# Patient Record
Sex: MALE | Race: WHITE
[De-identification: names, ages, dates, MRNs, and addresses within clinical notes are randomized per-mention and may not be internally consistent; named-entity substitution may affect disease eponyms.]

---

## 2017-03-27 ENCOUNTER — HOSPITAL ENCOUNTER (EMERGENCY)
Dept: HOSPITAL 58 - ED | Age: 32
Discharge: HOME | End: 2017-03-27

## 2017-03-27 VITALS — BODY MASS INDEX: 31.1 KG/M2

## 2017-03-27 VITALS — TEMPERATURE: 99.9 F | SYSTOLIC BLOOD PRESSURE: 149 MMHG | DIASTOLIC BLOOD PRESSURE: 92 MMHG

## 2017-03-27 DIAGNOSIS — J02.9: Primary | ICD-10-CM

## 2017-03-27 DIAGNOSIS — F17.210: ICD-10-CM

## 2017-03-27 PROCEDURE — 87880 STREP A ASSAY W/OPTIC: CPT

## 2017-03-27 PROCEDURE — 99283 EMERGENCY DEPT VISIT LOW MDM: CPT

## 2017-03-27 PROCEDURE — 87651 STREP A DNA AMP PROBE: CPT

## 2017-03-27 PROCEDURE — 96372 THER/PROPH/DIAG INJ SC/IM: CPT

## 2017-03-27 NOTE — ED.PDOC
General


ED Provider: 


Dr. ROXANNE ABBASI





Chief Complaint: Sore Throat


Stated Complaint: sore throat


Time Seen by Physician: 09:10


Mode of Arrival: Walk-In


Information Source: Patient


Exam Limitations: No limitations


Primary Care Provider: 


TASIA RAM





Nursing and Triage Documentation Reviewed and Agree: Yes





EENT Complaint Exam





- Throat Complaint/Exam


Symptoms Are: Still present


Timimg: Constant


Initial Severity: Moderate


Current Severity: Moderate


Aggravating: Reports: Eating


Alleviating: Reports: None


Associated Signs and Symptoms: Reports: Cough, Nasal congestion.  Denies: Fever

, Dysphagia, Drooling, Foreign body sensation, Chills, Wheezing, Hoarseness, 

Sinus discomfort, Difficulty breathing, Lethargy, Irritability, Decreased 

activity, Vomiting, Diarrhea, Decreased hearing, Ear drainage


Uvula Midline: Yes


Joyce-tonsillar Fluctuence: No


Scarlatinaform Rash Present: No


Stridor Present: No


Sinus Tenderness Present: No


Tonsillar Hypertrophy Present: No


Tonsillar Exudate Present: No


Joyce-tonsillar Swelling Present: Yes (left side withe exudate , the swelling 

how ever is minimal  nonetoxic pt)


Adenopathy Present: No


Splenomegaly Present: No


Differential Diagnoses: Pharyngitis, Tonsillitis





Review of Systems





- Review Of Systems


Constitutional: Reports: No symptoms


Eyes: Reports: No symptoms


Ears, Nose, Mouth, Throat: Reports: Throat pain


Respiratory: Reports: No symptoms


Cardiac: Reports: No symptoms


GI: Reports: No symptoms


: Reports: No symptoms


Musculoskeletal: Reports: No symptoms


Skin: Reports: No symptoms


Neurological: Reports: No symptoms


Endocrine: Reports: No symptoms


Hematologic/Lymphatic: Reports: No symptoms


All Other Systems: Reviewed and Negative





Past Medical History





- Past Medical History


Endocrine: Reports: None


Cardiovascular: Reports: None


Respiratory: Reports: None


Hematological: Reports: None


Gastrointestinal: Reports: GERD


Genitourinary: Reports: None


Neuro/Psych: Reports: None


Musculoskeletal: Reports: None


Cancer: Reports: None





- Surgical History


General Surgical History: Reports: Hernia Repair (umbilical hernia repair), 

Other (gastroscopy for gerd(not bronchoscopy))





- Family History


Family History: Reports: Unknown





- Social History


Smoking Status: Current some day smoker


Hx Substance Use: No


Alcohol Screening: None





Physical Exam





- Physical Exam


Appearance: Well-appearing, No pain distress, Well-nourished


Eyes: YAN, EOMI, Conjunctiva clear


ENT: Erythema, Exudate


Respiratory: Airway patent, Breath sounds clear, Breath sounds equal, 

Respirations nonlabored


Cardiovascular: RRR, Pulses normal, No rub, No murmur


GI/: Soft, Nontender, No masses, Bowel sounds normal, No Organomegaly


Musculoskeletal: Normal strength, ROM intact, No edema, No calf tenderness


Skin: Warm, Dry, Normal color


Neurological: Sensation intact, Motor intact, Reflexes intact, Cranial nerves 

intact, Alert, Oriented


Psychiatric: Affect appropriate, Mood appropriate





Critical Care Note





- Critical Care Note


Total Time (mins): 0





Course





- Course


Vital Signs: 





 











  Temp Pulse Resp BP Pulse Ox


 


 03/27/17 09:09  99.9 F H  84  20  149/92 H  96














Departure





- Departure


Time of Disposition: 09:25


Disposition: HOME SELF-CARE


Discharge Problem: 


 Sore throat symptom, Streptococcal sore throat





Pharyngitis


Qualifiers:


 Pharyngitis/tonsillitis etiology: unspecified etiology Qualifier Code: (J02.9) 

Acute pharyngitis, unspecified





Instructions:  Pharyngitis (ED), Strep Throat (ED), Tonsillitis (ED)


Condition: Good


Pt referred to PMD for follow-up: No


Additional Instructions: 


Please call your Family Physician as soon as possible to schedule a follow-up 

appointment.follow the printed discharge instruction carefully


Allergies/Adverse Reactions: 


Allergies





No Known Allergies Allergy (Verified 03/27/17 09:13)


 








Home Medications: 


Ambulatory Orders





Ranitidine HCl [Zantac] 300 mg PO BID 10/12/14 


Amoxicillin 500 mg PO Q6HR #42 tablet 03/27/17

## 2017-06-09 ENCOUNTER — HOSPITAL ENCOUNTER (EMERGENCY)
Dept: HOSPITAL 58 - ED | Age: 32
Discharge: HOME | End: 2017-06-09

## 2017-06-09 VITALS — BODY MASS INDEX: 31.1 KG/M2

## 2017-06-09 VITALS — DIASTOLIC BLOOD PRESSURE: 98 MMHG | SYSTOLIC BLOOD PRESSURE: 157 MMHG | TEMPERATURE: 99.4 F
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
## 2017-06-09 DIAGNOSIS — F17.210: ICD-10-CM

## 2017-06-09 DIAGNOSIS — S61.219A: Primary | ICD-10-CM

## 2017-06-09 PROCEDURE — 96372 THER/PROPH/DIAG INJ SC/IM: CPT

## 2017-06-09 PROCEDURE — 99283 EMERGENCY DEPT VISIT LOW MDM: CPT

## 2017-06-09 NOTE — ED.PDOC
General


ED Provider: 


Dr. LELA MORIN





Chief Complaint: Finger Laceration


Stated Complaint: while cleaning the car, hand stuck on sharp object, and hat 

to rt hand.


Time Seen by Physician: 17:39


Mode of Arrival: Walk-In


Information Source: Patient


Primary Care Provider: 


TASIA RAM





Nursing and Triage Documentation Reviewed and Agree: Yes





Skin Complaint Exam





- Laceration/Upper Ext. Complaint/Exam


Location of Injury: Right


Mechanism of Injury: Laceration


Symptoms Are: Still present


Initial Severity: Mild


Current Severity: Mild


Aggravating: Movement


Alleviating: None


Differential Diagnoses: Laceration





Review of Systems





- Review Of Systems


Constitutional: Reports: No symptoms


Eyes: Reports: No symptoms


Ears, Nose, Mouth, Throat: Reports: No symptoms


Respiratory: Reports: No symptoms


Cardiac: Reports: No symptoms


GI: Reports: No symptoms


: Reports: No symptoms


Musculoskeletal: Reports: No symptoms


Skin: Reports: No symptoms


Neurological: Reports: No symptoms


Endocrine: Reports: No symptoms


Hematologic/Lymphatic: Reports: No symptoms


All Other Systems: Reviewed and Negative





Past Medical History





- Past Medical History


Previously Healthy: No


Endocrine: Reports: None


Cardiovascular: Reports: None


Respiratory: Reports: None


Hematological: Reports: None


Gastrointestinal: Reports: GERD


Genitourinary: Reports: None


Neuro/Psych: Reports: None


Musculoskeletal: Reports: None


Cancer: Reports: None





- Surgical History


General Surgical History: Reports: Hernia Repair (umbilical hernia repair), 

Other (gastroscopy for gerd(not bronchoscopy))





- Family History


Family History: Reports: Unknown





- Social History


Smoking Status: Current some day smoker, Former smoker


Smoking Cessation Counseling Time: > 3 min - 10 min


Hx Substance Use: No


Alcohol Screening: Occasionally





- Immunizations


Tetanus Shot up to Date: Yes





Physical Exam





- Physical Exam


Appearance: Well-appearing, No pain distress, Well-nourished


Eyes: YAN, EOMI, Conjunctiva clear


ENT: Ears normal, Nose normal, Oropharynx normal


Respiratory: Airway patent, Breath sounds clear, Breath sounds equal, 

Respirations nonlabored


Cardiovascular: RRR, Pulses normal, No rub, No murmur


GI/: Soft, Nontender, No masses, Bowel sounds normal, No Organomegaly


Musculoskeletal: Normal strength, ROM intact, No edema, No calf tenderness


Skin: Warm, Dry, Normal color


Neurological: Sensation intact, Motor intact, Reflexes intact, Cranial nerves 

intact, Alert, Oriented


Psychiatric: Affect appropriate, Mood appropriate





Procedures





- Laceration/Wound Repair


  ** No standard instances


Wound Description: Linear


Wound Length (cm): 2 cm


Wound Width: 0.2 cm


Wound Explored: Clean


Wound Irrigated: Yes


Wound Prep: Saline, Hibiclens


Anesthesia: Lidocaine


Wound Repaired With: Sutures


Number of Sutures: 3





Critical Care Note





- Critical Care Note


Total Time (mins): 0





Course





- Course


Orders, Labs, Meds: 


Orders











 Category Date Time Status


 


 Lidocaine HCl/Pf [Lidocaine 1 % Amp 5 ml (Sutures)] MEDS  06/09/17 17:39 

Discontinued





 5 ml SUBCUT ONCE STA   








Medications














Discontinued Medications














Generic Name Dose Route Start Last Admin





  Trade Name Freq  PRN Reason Stop Dose Admin


 


Lidocaine HCl  5 ml  06/09/17 17:39  





  Lidocaine 1 % Amp 5 Ml (Sutures)  SUBCUT  06/09/17 17:40  





  ONCE STA   











Vital Signs: 


 











  Temp Pulse Resp BP Pulse Ox


 


 06/09/17 17:23  99.4 F  98 H  18  157/98 H  98














Departure





- Departure


Time of Disposition: 17:55


Disposition: HOME SELF-CARE


Discharge Problem: 


 Laceration of finger





Instructions:  Laceration (ED)


Condition: Stable


Pt referred to PMD for follow-up: Yes


Additional Instructions: 


Tylenol prn


keep wound clean


If redness or swelling needs f/u with PMD





Allergies/Adverse Reactions: 


Allergies





No Known Allergies Allergy (Verified 06/09/17 17:27)


 








Home Medications: 


Ambulatory Orders





Ranitidine HCl [Zantac] 300 mg PO BID 10/12/14 








Disposition Discussed With: Patient

## 2017-06-15 RX ORDER — RANITIDINE 300 MG/1
TABLET ORAL
Qty: 60 TABLET | Refills: 2 | Status: SHIPPED | OUTPATIENT
Start: 2017-06-15 | End: 2017-09-15 | Stop reason: SDUPTHER

## 2017-06-19 ENCOUNTER — APPOINTMENT (OUTPATIENT)
Dept: CT IMAGING | Age: 32
End: 2017-06-19
Payer: MEDICAID

## 2017-06-19 ENCOUNTER — APPOINTMENT (OUTPATIENT)
Dept: GENERAL RADIOLOGY | Age: 32
End: 2017-06-19
Payer: MEDICAID

## 2017-06-19 ENCOUNTER — HOSPITAL ENCOUNTER (EMERGENCY)
Age: 32
Discharge: HOME | End: 2017-06-19
Payer: MEDICAID

## 2017-06-19 VITALS
HEIGHT: 70 IN | RESPIRATION RATE: 20 BRPM | TEMPERATURE: 98 F | WEIGHT: 235 LBS | OXYGEN SATURATION: 99 % | HEART RATE: 78 BPM | BODY MASS INDEX: 33.64 KG/M2 | SYSTOLIC BLOOD PRESSURE: 154 MMHG | DIASTOLIC BLOOD PRESSURE: 90 MMHG

## 2017-06-19 DIAGNOSIS — M54.50 ACUTE MIDLINE LOW BACK PAIN WITHOUT SCIATICA: ICD-10-CM

## 2017-06-19 DIAGNOSIS — S16.1XXA CERVICAL STRAIN, ACUTE, INITIAL ENCOUNTER: ICD-10-CM

## 2017-06-19 DIAGNOSIS — M25.551 HIP PAIN, ACUTE, RIGHT: ICD-10-CM

## 2017-06-19 DIAGNOSIS — M79.672 LEFT FOOT PAIN: ICD-10-CM
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
## 2017-06-19 LAB
ALBUMIN SERPL-MCNC: 4.5 G/DL (ref 3.5–5.2)
ALP BLD-CCNC: 67 U/L (ref 40–130)
ALT SERPL-CCNC: 80 U/L (ref 5–41)
ANION GAP SERPL CALCULATED.3IONS-SCNC: 17 MMOL/L (ref 7–19)
AST SERPL-CCNC: 39 U/L (ref 5–40)
BILIRUB SERPL-MCNC: 0.4 MG/DL (ref 0.2–1.2)
BUN BLDV-MCNC: 14 MG/DL (ref 6–20)
CALCIUM SERPL-MCNC: 9.6 MG/DL (ref 8.6–10)
CHLORIDE BLD-SCNC: 102 MMOL/L (ref 98–111)
CO2: 22 MMOL/L (ref 22–29)
CREAT SERPL-MCNC: 1 MG/DL (ref 0.5–1.2)
GFR NON-AFRICAN AMERICAN: >60
GLUCOSE BLD-MCNC: 93 MG/DL (ref 74–109)
HCT VFR BLD CALC: 48.2 % (ref 42–52)
HEMOGLOBIN: 16 G/DL (ref 14–18)
MCH RBC QN AUTO: 30.2 PG (ref 27–31)
MCHC RBC AUTO-ENTMCNC: 33.2 G/DL (ref 33–37)
MCV RBC AUTO: 90.9 FL (ref 80–94)
PDW BLD-RTO: 13.2 % (ref 11.5–14.5)
PLATELET # BLD: 294 K/UL (ref 130–400)
PMV BLD AUTO: 9.5 FL (ref 9.4–12.4)
POTASSIUM SERPL-SCNC: 3.8 MMOL/L (ref 3.5–5)
RBC # BLD: 5.3 M/UL (ref 4.7–6.1)
SODIUM BLD-SCNC: 141 MMOL/L (ref 136–145)
TOTAL PROTEIN: 8.3 G/DL (ref 6.6–8.7)
WBC # BLD: 14.2 K/UL (ref 4.8–10.8)

## 2017-06-19 PROCEDURE — 70450 CT HEAD/BRAIN W/O DYE: CPT

## 2017-06-19 PROCEDURE — 6360000002 HC RX W HCPCS

## 2017-06-19 PROCEDURE — 99283 EMERGENCY DEPT VISIT LOW MDM: CPT | Performed by: NURSE PRACTITIONER

## 2017-06-19 PROCEDURE — 72072 X-RAY EXAM THORAC SPINE 3VWS: CPT

## 2017-06-19 PROCEDURE — 85027 COMPLETE CBC AUTOMATED: CPT

## 2017-06-19 PROCEDURE — 73502 X-RAY EXAM HIP UNI 2-3 VIEWS: CPT

## 2017-06-19 PROCEDURE — 72125 CT NECK SPINE W/O DYE: CPT

## 2017-06-19 PROCEDURE — 73630 X-RAY EXAM OF FOOT: CPT

## 2017-06-19 PROCEDURE — 96374 THER/PROPH/DIAG INJ IV PUSH: CPT

## 2017-06-19 PROCEDURE — 36415 COLL VENOUS BLD VENIPUNCTURE: CPT

## 2017-06-19 PROCEDURE — 99284 EMERGENCY DEPT VISIT MOD MDM: CPT

## 2017-06-19 PROCEDURE — 96375 TX/PRO/DX INJ NEW DRUG ADDON: CPT

## 2017-06-19 PROCEDURE — 71250 CT THORAX DX C-: CPT

## 2017-06-19 PROCEDURE — 80053 COMPREHEN METABOLIC PANEL: CPT

## 2017-06-19 PROCEDURE — 6360000002 HC RX W HCPCS: Performed by: NURSE PRACTITIONER

## 2017-06-19 PROCEDURE — 72100 X-RAY EXAM L-S SPINE 2/3 VWS: CPT

## 2017-06-19 RX ORDER — HYDROCODONE BITARTRATE AND ACETAMINOPHEN 5; 325 MG/1; MG/1
1 TABLET ORAL EVERY 6 HOURS PRN
Qty: 15 TABLET | Refills: 0 | Status: SHIPPED | OUTPATIENT
Start: 2017-06-19 | End: 2017-06-26

## 2017-06-19 RX ORDER — ONDANSETRON 2 MG/ML
4 INJECTION INTRAMUSCULAR; INTRAVENOUS ONCE
Status: COMPLETED | OUTPATIENT
Start: 2017-06-19 | End: 2017-06-19

## 2017-06-19 RX ORDER — CYCLOBENZAPRINE HCL 10 MG
10 TABLET ORAL 3 TIMES DAILY PRN
Qty: 30 TABLET | Refills: 0 | Status: SHIPPED | OUTPATIENT
Start: 2017-06-19 | End: 2017-06-29

## 2017-06-19 RX ADMIN — HYDROMORPHONE HYDROCHLORIDE 1 MG: 1 INJECTION, SOLUTION INTRAMUSCULAR; INTRAVENOUS; SUBCUTANEOUS at 19:58

## 2017-06-19 RX ADMIN — ONDANSETRON 4 MG: 2 INJECTION INTRAMUSCULAR; INTRAVENOUS at 19:32

## 2017-06-19 RX ADMIN — HYDROMORPHONE HYDROCHLORIDE 1 MG: 1 INJECTION, SOLUTION INTRAMUSCULAR; INTRAVENOUS; SUBCUTANEOUS at 19:33

## 2017-06-19 ASSESSMENT — ENCOUNTER SYMPTOMS
BACK PAIN: 1
SHORTNESS OF BREATH: 0
NAUSEA: 1
ABDOMINAL PAIN: 1
VOMITING: 0

## 2017-06-19 ASSESSMENT — PAIN DESCRIPTION - PAIN TYPE: TYPE: ACUTE PAIN

## 2017-06-19 ASSESSMENT — PAIN SCALES - GENERAL
PAINLEVEL_OUTOF10: 8
PAINLEVEL_OUTOF10: 9
PAINLEVEL_OUTOF10: 5

## 2017-06-19 ASSESSMENT — PAIN DESCRIPTION - LOCATION: LOCATION: GENERALIZED

## 2017-06-20 PROBLEM — K76.0 FATTY LIVER: Status: ACTIVE | Noted: 2017-06-20

## 2017-06-27 ENCOUNTER — HOSPITAL ENCOUNTER (EMERGENCY)
Dept: HOSPITAL 58 - ED | Age: 32
Discharge: HOME | End: 2017-06-27

## 2017-06-27 VITALS — BODY MASS INDEX: 31.8 KG/M2

## 2017-06-27 VITALS — SYSTOLIC BLOOD PRESSURE: 152 MMHG | TEMPERATURE: 99.3 F | DIASTOLIC BLOOD PRESSURE: 92 MMHG

## 2017-06-27 DIAGNOSIS — M54.5: Primary | ICD-10-CM
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
## 2017-06-27 DIAGNOSIS — S91.209D: ICD-10-CM

## 2017-06-27 DIAGNOSIS — F17.210: ICD-10-CM

## 2017-06-27 PROCEDURE — 99283 EMERGENCY DEPT VISIT LOW MDM: CPT

## 2017-06-27 PROCEDURE — 96372 THER/PROPH/DIAG INJ SC/IM: CPT
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
## 2017-06-28 PROBLEM — F41.9 ANXIETY: Chronic | Status: ACTIVE | Noted: 2017-06-28

## 2017-06-28 PROBLEM — Z00.00 WELLNESS EXAMINATION: Status: ACTIVE | Noted: 2017-06-28

## 2017-06-28 PROBLEM — K08.9 TEETH PROBLEM: Status: ACTIVE | Noted: 2017-06-28

## 2017-06-28 PROBLEM — K21.9 GASTROESOPHAGEAL REFLUX DISEASE: Chronic | Status: ACTIVE | Noted: 2017-06-28

## 2017-06-28 PROBLEM — R31.9 HEMATURIA: Status: ACTIVE | Noted: 2017-06-28

## 2017-06-29 ENCOUNTER — OFFICE VISIT (OUTPATIENT)
Dept: FAMILY MEDICINE CLINIC | Facility: CLINIC | Age: 32
End: 2017-06-29

## 2017-06-29 VITALS
HEIGHT: 72 IN | OXYGEN SATURATION: 97 % | WEIGHT: 235 LBS | RESPIRATION RATE: 16 BRPM | DIASTOLIC BLOOD PRESSURE: 92 MMHG | HEART RATE: 80 BPM | TEMPERATURE: 98.5 F | BODY MASS INDEX: 31.83 KG/M2 | SYSTOLIC BLOOD PRESSURE: 124 MMHG

## 2017-06-29 DIAGNOSIS — M54.9 BACK PAIN, UNSPECIFIED BACK LOCATION, UNSPECIFIED BACK PAIN LATERALITY, UNSPECIFIED CHRONICITY: ICD-10-CM
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
## 2017-06-29 DIAGNOSIS — S90.229A CONTUSION OF TOE WITH DAMAGE TO NAIL, UNSPECIFIED TOE, INITIAL ENCOUNTER: ICD-10-CM

## 2017-06-29 PROBLEM — S90.129A CONTUSION, TOE: Status: ACTIVE | Noted: 2017-06-29

## 2017-06-29 PROCEDURE — 99213 OFFICE O/P EST LOW 20 MIN: CPT | Performed by: FAMILY MEDICINE

## 2017-06-29 RX ORDER — CYCLOBENZAPRINE HCL 10 MG
1 TABLET ORAL 3 TIMES DAILY PRN
Refills: 0 | COMMUNITY
Start: 2017-06-19 | End: 2021-10-30 | Stop reason: SDUPTHER

## 2017-06-29 RX ORDER — NAPROXEN 500 MG/1
1 TABLET ORAL EVERY 12 HOURS
Refills: 0 | COMMUNITY
Start: 2017-06-27 | End: 2021-10-30 | Stop reason: SDUPTHER

## 2017-06-29 RX ORDER — HYDROCODONE BITARTRATE AND ACETAMINOPHEN 5; 325 MG/1; MG/1
1-2 TABLET ORAL EVERY 6 HOURS PRN
Refills: 0 | COMMUNITY
Start: 2017-06-27 | End: 2017-07-06 | Stop reason: SDUPTHER
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
## 2017-07-06 RX ORDER — HYDROCODONE BITARTRATE AND ACETAMINOPHEN 5; 325 MG/1; MG/1
1-2 TABLET ORAL EVERY 6 HOURS PRN
Qty: 30 TABLET | Refills: 0 | Status: SHIPPED | OUTPATIENT
Start: 2017-07-06 | End: 2017-07-31
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
## 2017-07-17 NOTE — RS.OPPTDN
Subjective


Date of Note: 07/17/17


Visit #: 2


Date of Evaluation: 07/12/17


Payer Source: Medicaid


Treatment Diagnosis: Low back pain


Current Subjective/complaints:: Patient reports he is more comfortable resting 

on his stomach currently.





Pain Assessment





- Pain Description


Pain Location: Low back and right hip region


Pain Description: Radiating, Aching


Current Pain Intensity: 8/10





- Treatment


Modality: Electrical Stim Unattended


Parameters/Method Applied: 20 mins. high volt to lumbar area in prone ,channel 

1 @ 170 pv, channel 2 @ 160 pv.


Patient Position: Prone





- Heat/Cryotherapy


Treatment: Hot Pack (concurrent with e-stim)





Interventions





- Exercise/Activities/Manual Therapy


Exercises/Activities: Attempted exercises in prone and supine ,but both 

elicited pain.


Total minutes of Exercise: 0


Manual Therapy: NA


Total minutes of Manual Therapy: 0


HOME EXERCISE PROGRAM: none at this time





- Charges


Total Direct Minutes: 0


Total Treatment Time: 20


Procedures billed for this date of service:: hp,e-stim


Assessment: Patient reports sciatic pain today in te R hip  and LE.he reports 

increased pain with attempting flexion or extension.We will resume exercises 

when he can tolerate them,possibly use manual therapy for pain control .


Patient Education: Education of diagnosis, Body/Joint mechanics, Home Exercise 

Program, Home Safety, Activity Modification, Education of Plan of Care


Patient demonstrates compliance with HEP?: Yes





Short Term Goals


Goal #1: Patient will display independence with home exercise program. 


Goal to be met by: 07/27/17


Goal #2: Lumbar paraspinals decreased to mininmal.


Goal to be met by: 07/27/17


Progress towards Goal:: No Change


Goal #3: Pain at rest <5/10.


Goal to be met by: 07/27/17


Goal #4: Lumbar AROM WFL's with minimal discomfort.


Goal to be met by: 07/27/17





Long Term Goals


Goal #1: Pt knows HEP and to continue ex's to maintain level at D/C.


Goal to be met by: 08/17/17


Goal #2: Score on Oswestry LBP scale improved to 30.


Goal to be met by: 08/17/17


Goal #3: Patient will report normal sleep pattern.


Goal to be met by: 08/17/17


Goal #4: Pt able to tolerate prolonged standing & walking with minimal back 

pain.


Goal to be met by: 08/17/17





Plan


PLAN OF CARE EXPIRES ON:: 08/17/17


ORDER # VISITS AND/OR THROUGH DATE: 8/17/17


PLAN: Continue Plan of Care

## 2017-07-21 NOTE — RS.OPPTDN
Subjective


Date of Note: 07/21/17


Visit #: 3


Date of Evaluation: 07/12/17


Payer Source: Medicaid


Treatment Diagnosis: Low back pain


Current Subjective/complaints:: Reports the back is slightly better today,but 

was off from work on Wed. and Thurs.He did work today,feels fatigued.





Pain Assessment





- Pain Description


Pain Location: Low back and right hip region


Pain Description: Radiating, Aching


Current Pain Intensity: 7/10





- Treatment


Modality: Electrical Stim Unattended


Parameters/Method Applied: 20 mins. high volt to lumbar,channel 1 @ 105 and 

channel 2 @ 135 pv.


Patient Position: Prone





- Heat/Cryotherapy


Treatment: Hot Pack (concurrent with e-stim)





Interventions





- Exercise/Activities/Manual Therapy


Exercises/Activities: 20 mins. SKTC,DKTC,90/90 hamstring stretches,piriformis 

stretches,pelvic tilts.


Total minutes of Exercise: 20


Manual Therapy: NA


Total minutes of Manual Therapy: 0


HOME EXERCISE PROGRAM: None at this time.





- Charges


Total Direct Minutes: 20


Total Treatment Time: 40


Procedures billed for this date of service:: hp,e-stim,ex 1


Assessment: Patient able to tolerate the exercises today ,as his pain is 

slightly better.He has moderate tightness in the hamstrings,but responds well 

to the stretches.He has good understanding of positioning for pain relief.


Patient Education: Education of diagnosis, Body/Joint mechanics, Home Exercise 

Program, Home Safety, Activity Modification, Education of Plan of Care


Patient demonstrates compliance with HEP?: Yes





Short Term Goals


Goal #1: Patient will display independence with home exercise program. 


Goal to be met by: 07/27/17


Progress towards Goal:: Progressing


Goal #2: Lumbar paraspinals decreased to mininmal.


Goal to be met by: 07/27/17


Progress towards Goal:: Progressing


Goal #3: Pain at rest <5/10.


Goal to be met by: 07/27/17


Goal #4: Lumbar AROM WFL's with minimal discomfort.


Goal to be met by: 07/27/17





Long Term Goals


Goal #1: Pt knows HEP and to continue ex's to maintain level at D/C.


Goal to be met by: 08/17/17


Progress towards goal: Progressing


Goal #2: Score on Oswestry LBP scale improved to 30.


Goal to be met by: 08/17/17


Goal #3: Patient will report normal sleep pattern.


Goal to be met by: 08/17/17


Goal #4: Pt able to tolerate prolonged standing & walking with minimal back 

pain.


Goal to be met by: 08/17/17





Plan


PLAN OF CARE EXPIRES ON:: 08/17/17


ORDER # VISITS AND/OR THROUGH DATE: 8/17/17


PLAN: Continue Plan of Care

## 2017-07-25 NOTE — RS.OPPTDN
Subjective


Date of Note: 07/25/17


Visit #: 4


Date of Evaluation: 07/12/17


Payer Source: Medicaid


Treatment Diagnosis: Low back pain


Current Subjective/complaints:: Patient reports the back feels about the same 

today,has had busy work day.





Pain Assessment





- Pain Description


Pain Location: Low back


Pain Description: Aching


Current Pain Intensity: 7/10





- Treatment


Modality: Electrical Stim Unattended


Parameters/Method Applied: 20 mins. high volt,channel 1 and 2 @ 120 pv.


Patient Position: Prone





- Heat/Cryotherapy


Treatment: Hot Pack (concurrent with e-stim)





Interventions





- Exercise/Activities/Manual Therapy


Exercises/Activities: 20 mins. SKTC,DKTC,90/90 hamstring stretches with 

contract - relax,piriformis stretches,pelvic tilts.Alternating hip flexion ,

resisted hip flexion.


Total minutes of Exercise: 20


Manual Therapy: NA


Total minutes of Manual Therapy: 0


HOME EXERCISE PROGRAM: Pelvic tilts,SKTC,DKTC,90/90 hams. stretches,piriformis 

stretches,prone on elbows.





- Charges


Total Direct Minutes: 20


Total Treatment Time: 40


Procedures billed for this date of service:: hp,e-stim,ex 1


Assessment: Patient has moderate hamstring tightness bilaterally.He has 

increased pain with R piriformis stretch today.He reports the pain varies with 

amount of daily tasks,frequently has to change positions while sleeping.


Patient Education: Education of diagnosis, Body/Joint mechanics, Home Exercise 

Program, Home Safety, Activity Modification, Education of Plan of Care





Short Term Goals


Goal #1: Patient will display independence with home exercise program. 


Goal to be met by: 07/27/17


Progress towards Goal:: Partially Met


Goal #2: Lumbar paraspinals decreased to mininmal.


Goal to be met by: 07/27/17


Progress towards Goal:: Progressing


Goal #3: Pain at rest <5/10.


Goal to be met by: 07/27/17


Progress towards Goal:: No Change


Goal #4: Lumbar AROM WFL's with minimal discomfort.


Goal to be met by: 07/27/17


Progress towards Goal:: Progressing





Long Term Goals


Goal #1: Pt knows HEP and to continue ex's to maintain level at D/C.


Goal to be met by: 08/17/17


Progress towards goal: Progressing


Goal #2: Score on Oswestry LBP scale improved to 30.


Goal to be met by: 08/17/17


Goal #3: Patient will report normal sleep pattern.


Goal to be met by: 08/17/17


Goal #4: Pt able to tolerate prolonged standing & walking with minimal back 

pain.


Goal to be met by: 08/17/17





Plan


PLAN OF CARE EXPIRES ON:: 08/17/17


ORDER # VISITS AND/OR THROUGH DATE: 8/17/17


PLAN: Continue Plan of Care

## 2017-07-28 ENCOUNTER — HOSPITAL ENCOUNTER (OUTPATIENT)
Dept: HOSPITAL 58 - REHAB | Age: 32
LOS: 3 days | End: 2017-07-31
Attending: FAMILY MEDICINE

## 2017-07-28 VITALS — BODY MASS INDEX: 31.8 KG/M2

## 2017-07-28 DIAGNOSIS — M54.9: Primary | ICD-10-CM

## 2017-07-28 NOTE — RS.OPPTDN
Subjective


Date of Note: 07/28/17


Visit #: 5


Date of Evaluation: 07/12/17


Payer Source: Medicaid


Treatment Diagnosis: Low back pain


Current Subjective/complaints:: Patient reports his back pain is about the same 

today,has been on his hands and knees alot today at work.





Pain Assessment





- Pain Description


Pain Location: Low back /R hip


Pain Description: Aching


Current Pain Intensity: 7/10





- Treatment


Modality: Electrical Stim Unattended


Parameters/Method Applied: 20 mins. high volt to lumbar ,channel 1 and channel 

2 @ 155 pv.


Patient Position: Prone





- Heat/Cryotherapy


Treatment: Hot Pack (co ncurrent with e-stim)





Interventions





- Exercise/Activities/Manual Therapy


Exercises/Activities: 20 mins. SKTC,DKTC,90/90 hamstring stretches with 

contract - relax,piriformis stretches,pelvic tilts.Alternating hip flexion ,

resisted hip flexion.


Total minutes of Exercise: 20


Manual Therapy: NA


Total minutes of Manual Therapy: 0


HOME EXERCISE PROGRAM: Pelvic tilts,SKTC,DKTC,90/90 hams. stretches,piriformis 

stretches,prone on elbows.





- Charges


Total Direct Minutes: 20


Total Treatment Time: 40


Procedures billed for this date of service:: hp,e-stim,ex 1


Assessment: Patient has increased tightness today in bilateral piriformis 

muscles,R > L today.He has improved hamstring extensibility present.He gets 

relief from prone and supine positioning today.


Patient Education: Education of diagnosis, Body/Joint mechanics, Home Exercise 

Program, Home Safety, Activity Modification, Education of Plan of Care


Patient demonstrates compliance with HEP?: Yes





Short Term Goals


Goal #1: Patient will display independence with home exercise program. 


Goal to be met by: 07/27/17


Progress towards Goal:: Partially Met


Goal #2: Lumbar paraspinals decreased to mininmal.


Goal to be met by: 07/27/17


Progress towards Goal:: Progressing


Goal #3: Pain at rest <5/10.


Goal to be met by: 07/27/17


Progress towards Goal:: No Change


Goal #4: Lumbar AROM WFL's with minimal discomfort.


Goal to be met by: 07/27/17


Progress towards Goal:: Progressing





Long Term Goals


Goal #1: Pt knows HEP and to continue ex's to maintain level at D/C.


Goal to be met by: 08/17/17


Progress towards goal: Progressing


Goal #2: Score on Oswestry LBP scale improved to 30.


Goal to be met by: 08/17/17


Goal #3: Patient will report normal sleep pattern.


Goal to be met by: 08/17/17


Goal #4: Pt able to tolerate prolonged standing & walking with minimal back 

pain.


Goal to be met by: 08/17/17





Plan


PLAN OF CARE EXPIRES ON:: 08/17/17


ORDER # VISITS AND/OR THROUGH DATE: 8/17/17


PLAN: Continue Plan of Care

## 2017-07-31 ENCOUNTER — OFFICE VISIT (OUTPATIENT)
Dept: FAMILY MEDICINE CLINIC | Facility: CLINIC | Age: 32
End: 2017-07-31

## 2017-07-31 VITALS
DIASTOLIC BLOOD PRESSURE: 100 MMHG | HEART RATE: 100 BPM | TEMPERATURE: 98.7 F | HEIGHT: 72 IN | RESPIRATION RATE: 18 BRPM | OXYGEN SATURATION: 98 % | WEIGHT: 231 LBS | BODY MASS INDEX: 31.29 KG/M2 | SYSTOLIC BLOOD PRESSURE: 160 MMHG

## 2017-07-31 DIAGNOSIS — S91.203D: ICD-10-CM
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
## 2017-07-31 DIAGNOSIS — S90.229A CONTUSION OF TOE WITH DAMAGE TO NAIL, UNSPECIFIED TOE, INITIAL ENCOUNTER: ICD-10-CM

## 2017-07-31 DIAGNOSIS — M54.9 BACK PAIN, UNSPECIFIED BACK LOCATION, UNSPECIFIED BACK PAIN LATERALITY, UNSPECIFIED CHRONICITY: Primary | ICD-10-CM

## 2017-07-31 PROCEDURE — 99213 OFFICE O/P EST LOW 20 MIN: CPT | Performed by: FAMILY MEDICINE

## 2017-07-31 RX ORDER — HYDROCODONE BITARTRATE AND ACETAMINOPHEN 5; 325 MG/1; MG/1
1-2 TABLET ORAL NIGHTLY PRN
Qty: 15 TABLET | Refills: 0 | Status: SHIPPED | OUTPATIENT
Start: 2017-07-31

## 2017-08-01 ENCOUNTER — HOSPITAL ENCOUNTER (OUTPATIENT)
Dept: HOSPITAL 58 - RAD | Age: 32
Discharge: HOME | End: 2017-08-01
Attending: FAMILY MEDICINE

## 2017-08-01 VITALS — BODY MASS INDEX: 31.8 KG/M2

## 2017-08-01 DIAGNOSIS — M54.5: Primary | ICD-10-CM

## 2017-08-01 DIAGNOSIS — S90.229A: ICD-10-CM
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
## 2017-08-01 NOTE — MRI
EXAM:  MRI lumbar spine without IV contrast.    DATE: 1 August 2017. 

  

HISTORY:  Low back pain. 

  

TECHNIQUE:  Sagittal and axial T1W and T2W sequences of the lumbar spine along with sagittal IR and 
coronal T2W sequences were obtained using 1.5 Fani magnet.  No IV contrast. 

  

COMPARISON:  CT lumbar spine 07/16/2017.  MRI L-spine 21 January 2016. 

  

FINDINGS:  There are five non-rib-bearing lumbar vertebra.  No lumbar scoliosis is evident.  There i
s approximately 3 mm anterior subluxation of S1 relative to L5.  No other subluxation, acute fractur
e, osseous malignancy, or pars interarticularis defect is demonstrated. Lumbar vertebra are normal i
n height.  Anterior osteophytes noted at L4-5 and L5-S1.  There is minor disc desiccation at L4-5 an
d L5-S1.  Intervertebral discs are normal in height.  No sacral fracture or stress reaction is appar
ent.  SI joints are unremarkable.  Conus medullaris terminates at T12-L1.  Visible spinal cord is no
rmal. 

  

No retroperitoneal lymphadenopathy, paraspinal mass, or aortic aneurysm is detected.  Paraspinal mus
culature is symmetric bilaterally.  Visible portions of the liver, gallbladder, spleen, adrenal glan
ds and kidneys reveal no definitive abnormality. 

  

Segmental analysis: 

  

T12-L1:  Normal. 

  

L1-2:  Normal. 

  

L2-3:  Normal. 

  

L3-4:  Normal. 

  

L4-5:  Normal. 

  

L5-S1:  Minor anterior subluxation of S1, small concentric disc bulge, and minor facet arthropathy c
ause minimal/mild right and moderate left foraminal stenoses.  Left L5 nerve root contacts the disc 
bulge/osteophyte near the lateral margin of the foramen.  No central canal stenosis. 

  

  

IMPRESSIONS: 

  

1.  L-spine minor facet disease and mild DDD. 

2.  No lumbar spine central canal stenosis. 

3.  Mild right and moderate left foraminal stenoses at L5-S1.  Left L5 nerve root contacting the dis
c bulge near the foramen may be a source for pain/radiculopathy.

## 2017-08-03 ENCOUNTER — TELEPHONE (OUTPATIENT)
Dept: FAMILY MEDICINE CLINIC | Facility: CLINIC | Age: 32
End: 2017-08-03

## 2017-08-03 NOTE — TELEPHONE ENCOUNTER
----- Message from Lamin Waterman MD sent at 8/2/2017  7:55 PM CDT -----      ----- Message -----     From: Jelly Hebert     Sent: 8/2/2017   3:45 PM       To: Lamin Waterman MD      Not much here; hard to explain why he is having so much pain and not responding to time, PT  On to back MD for opinion

## 2017-09-15 RX ORDER — RANITIDINE 300 MG/1
TABLET ORAL
Qty: 60 TABLET | Refills: 5 | Status: SHIPPED | OUTPATIENT
Start: 2017-09-15 | End: 2018-04-11 | Stop reason: SDUPTHER

## 2017-10-09 PROBLEM — M54.9 CHRONIC BACK PAIN: Status: ACTIVE | Noted: 2017-10-09

## 2017-10-09 PROBLEM — G89.29 CHRONIC BACK PAIN: Status: ACTIVE | Noted: 2017-10-09

## 2017-10-11 ENCOUNTER — TELEPHONE (OUTPATIENT)
Dept: FAMILY MEDICINE CLINIC | Facility: CLINIC | Age: 32
End: 2017-10-11

## 2017-10-11 NOTE — TELEPHONE ENCOUNTER
Dr Lam office called back and states pt has apt with pain management 10/19/17 and pt was getting pain med from them but pt has tested positive on 2 drug test for THC, and the last one was oxycodone and THC

## 2017-10-11 NOTE — TELEPHONE ENCOUNTER
Pt called and wants to know if Dr Waterman would be willing to refill his [place] states he went to Ortho and they are referring to pain management but he has not heard anything from them and it has been about 3 wks and he is hurting really bad  last refill was 7/31/17

## 2017-10-11 NOTE — TELEPHONE ENCOUNTER
I would like to help; but when we called to find out if he was having trouble getting them to make him an appointment/pain management.  We were told he was + THC.    Tell him we cannot give the Rx with the THC +  Has to keep apt with pain management 10.19.17 and is going to have to be THC clean to get any Rx there too    If he keeps using the THC; [place] if the pain in his back becomes really long term ?
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
## 2018-02-26 ENCOUNTER — HOSPITAL ENCOUNTER (EMERGENCY)
Dept: HOSPITAL 58 - ED | Age: 33
LOS: 1 days | Discharge: HOME | End: 2018-02-27

## 2018-02-26 VITALS — SYSTOLIC BLOOD PRESSURE: 129 MMHG | TEMPERATURE: 98.8 F | DIASTOLIC BLOOD PRESSURE: 84 MMHG

## 2018-02-26 VITALS — BODY MASS INDEX: 31.8 KG/M2

## 2018-02-26 DIAGNOSIS — M54.5: Primary | ICD-10-CM

## 2018-02-26 PROCEDURE — 99283 EMERGENCY DEPT VISIT LOW MDM: CPT

## 2018-02-26 PROCEDURE — 96372 THER/PROPH/DIAG INJ SC/IM: CPT
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
## 2018-04-11 RX ORDER — RANITIDINE 300 MG/1
TABLET ORAL
Qty: 60 TABLET | Refills: 5 | Status: SHIPPED | OUTPATIENT
Start: 2018-04-11 | End: 2019-01-18 | Stop reason: SDUPTHER

## 2018-11-19 ENCOUNTER — HOSPITAL ENCOUNTER (EMERGENCY)
Age: 33
Discharge: HOME | End: 2018-11-19
Payer: COMMERCIAL

## 2018-11-19 ENCOUNTER — APPOINTMENT (OUTPATIENT)
Dept: GENERAL RADIOLOGY | Age: 33
End: 2018-11-19
Payer: COMMERCIAL

## 2018-11-19 VITALS
HEIGHT: 72 IN | TEMPERATURE: 98 F | WEIGHT: 235 LBS | RESPIRATION RATE: 18 BRPM | BODY MASS INDEX: 31.83 KG/M2 | OXYGEN SATURATION: 98 % | HEART RATE: 80 BPM | DIASTOLIC BLOOD PRESSURE: 74 MMHG | SYSTOLIC BLOOD PRESSURE: 136 MMHG

## 2018-11-19 DIAGNOSIS — S61.210A LACERATION OF RIGHT INDEX FINGER WITHOUT FOREIGN BODY WITHOUT DAMAGE TO NAIL, INITIAL ENCOUNTER: Primary | ICD-10-CM

## 2018-11-19 PROCEDURE — 96372 THER/PROPH/DIAG INJ SC/IM: CPT

## 2018-11-19 PROCEDURE — 73140 X-RAY EXAM OF FINGER(S): CPT

## 2018-11-19 PROCEDURE — 6360000002 HC RX W HCPCS: Performed by: NURSE PRACTITIONER

## 2018-11-19 PROCEDURE — 99283 EMERGENCY DEPT VISIT LOW MDM: CPT | Performed by: NURSE PRACTITIONER

## 2018-11-19 PROCEDURE — 99282 EMERGENCY DEPT VISIT SF MDM: CPT

## 2018-11-19 RX ORDER — CEPHALEXIN 500 MG/1
500 CAPSULE ORAL 4 TIMES DAILY
Qty: 28 CAPSULE | Refills: 0 | Status: SHIPPED | OUTPATIENT
Start: 2018-11-19 | End: 2018-11-26

## 2018-11-19 RX ORDER — LIDOCAINE HYDROCHLORIDE 10 MG/ML
20 INJECTION, SOLUTION EPIDURAL; INFILTRATION; INTRACAUDAL; PERINEURAL ONCE
Status: DISCONTINUED | OUTPATIENT
Start: 2018-11-19 | End: 2018-11-19 | Stop reason: HOSPADM

## 2018-11-19 RX ORDER — ONDANSETRON 4 MG/1
4 TABLET, ORALLY DISINTEGRATING ORAL ONCE
Status: COMPLETED | OUTPATIENT
Start: 2018-11-19 | End: 2018-11-19

## 2018-11-19 RX ORDER — MORPHINE SULFATE 10 MG/ML
4 INJECTION, SOLUTION INTRAMUSCULAR; INTRAVENOUS ONCE
Status: COMPLETED | OUTPATIENT
Start: 2018-11-19 | End: 2018-11-19

## 2018-11-19 RX ORDER — LIDOCAINE HYDROCHLORIDE 10 MG/ML
20 INJECTION, SOLUTION INFILTRATION; PERINEURAL ONCE
Status: DISCONTINUED | OUTPATIENT
Start: 2018-11-19 | End: 2018-11-19 | Stop reason: HOSPADM

## 2018-11-19 RX ORDER — LIDOCAINE HYDROCHLORIDE 10 MG/ML
INJECTION, SOLUTION EPIDURAL; INFILTRATION; INTRACAUDAL; PERINEURAL
Status: DISCONTINUED
Start: 2018-11-19 | End: 2018-11-19 | Stop reason: HOSPADM

## 2018-11-19 RX ORDER — DICLOFENAC 35 MG/1
35 CAPSULE ORAL 2 TIMES DAILY
Qty: 10 CAPSULE | Refills: 0 | Status: SHIPPED | OUTPATIENT
Start: 2018-11-19 | End: 2019-07-14

## 2018-11-19 RX ADMIN — MORPHINE SULFATE 4 MG: 10 INJECTION INTRAVENOUS at 17:21

## 2018-11-19 RX ADMIN — ONDANSETRON 4 MG: 4 TABLET, ORALLY DISINTEGRATING ORAL at 17:21

## 2018-11-19 ASSESSMENT — PAIN SCALES - GENERAL
PAINLEVEL_OUTOF10: 2
PAINLEVEL_OUTOF10: 8

## 2018-11-19 ASSESSMENT — PAIN DESCRIPTION - PAIN TYPE: TYPE: ACUTE PAIN

## 2018-11-19 ASSESSMENT — PAIN DESCRIPTION - LOCATION: LOCATION: FINGER (COMMENT WHICH ONE)

## 2018-12-13 RX ORDER — RANITIDINE 300 MG/1
TABLET ORAL
Qty: 60 TABLET | Refills: 5 | OUTPATIENT
Start: 2018-12-13

## 2018-12-18 RX ORDER — RANITIDINE 300 MG/1
TABLET ORAL
Qty: 60 TABLET | Refills: 5 | OUTPATIENT
Start: 2018-12-18

## 2019-01-07 ENCOUNTER — HOSPITAL ENCOUNTER (EMERGENCY)
Dept: HOSPITAL 58 - ED | Age: 34
Discharge: HOME | End: 2019-01-07

## 2019-01-07 VITALS — TEMPERATURE: 98.1 F | DIASTOLIC BLOOD PRESSURE: 93 MMHG | SYSTOLIC BLOOD PRESSURE: 141 MMHG

## 2019-01-07 VITALS — BODY MASS INDEX: 31.1 KG/M2
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
## 2019-01-07 DIAGNOSIS — S63.92XA: Primary | ICD-10-CM

## 2019-01-07 PROCEDURE — 99283 EMERGENCY DEPT VISIT LOW MDM: CPT
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
## 2019-01-16 RX ORDER — RANITIDINE 300 MG/1
TABLET ORAL
Qty: 60 TABLET | Refills: 5 | OUTPATIENT
Start: 2019-01-16

## 2019-01-18 RX ORDER — RANITIDINE 300 MG/1
TABLET ORAL
Qty: 60 TABLET | Refills: 5 | OUTPATIENT
Start: 2019-01-18

## 2019-01-18 RX ORDER — RANITIDINE 300 MG/1
300 TABLET ORAL 2 TIMES DAILY
Qty: 60 TABLET | Refills: 1 | Status: SHIPPED | OUTPATIENT
Start: 2019-01-18

## 2019-03-26 ENCOUNTER — HOSPITAL ENCOUNTER (EMERGENCY)
Dept: HOSPITAL 58 - ED | Age: 34
Discharge: HOME | End: 2019-03-26

## 2019-03-26 VITALS — DIASTOLIC BLOOD PRESSURE: 88 MMHG | TEMPERATURE: 98.8 F | SYSTOLIC BLOOD PRESSURE: 146 MMHG

## 2019-03-26 VITALS — BODY MASS INDEX: 30.6 KG/M2

## 2019-03-26 DIAGNOSIS — K08.89: Primary | ICD-10-CM

## 2019-03-26 DIAGNOSIS — K04.7: ICD-10-CM

## 2019-03-26 DIAGNOSIS — K13.79: ICD-10-CM

## 2019-03-26 PROCEDURE — 99282 EMERGENCY DEPT VISIT SF MDM: CPT
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
## 2019-06-04 ENCOUNTER — HOSPITAL ENCOUNTER (OUTPATIENT)
Dept: HOSPITAL 58 - RHC-LAB | Age: 34
Discharge: HOME | End: 2019-06-04
Attending: NURSE PRACTITIONER

## 2019-06-04 VITALS — BODY MASS INDEX: 30.6 KG/M2

## 2019-06-04 DIAGNOSIS — Z00.00: Primary | ICD-10-CM

## 2019-06-04 PROCEDURE — 84443 ASSAY THYROID STIM HORMONE: CPT

## 2019-06-04 PROCEDURE — 36415 COLL VENOUS BLD VENIPUNCTURE: CPT

## 2019-06-04 PROCEDURE — 80061 LIPID PANEL: CPT

## 2019-06-04 PROCEDURE — 85025 COMPLETE CBC W/AUTO DIFF WBC: CPT

## 2019-06-04 PROCEDURE — 80053 COMPREHEN METABOLIC PANEL: CPT

## 2019-06-15 ENCOUNTER — HOSPITAL ENCOUNTER (EMERGENCY)
Dept: HOSPITAL 58 - ED | Age: 34
Discharge: HOME | End: 2019-06-15

## 2019-06-15 VITALS — SYSTOLIC BLOOD PRESSURE: 153 MMHG | DIASTOLIC BLOOD PRESSURE: 95 MMHG | TEMPERATURE: 97.7 F

## 2019-06-15 VITALS — BODY MASS INDEX: 30.2 KG/M2

## 2019-06-15 DIAGNOSIS — M51.36: ICD-10-CM

## 2019-06-15 DIAGNOSIS — M54.5: Primary | ICD-10-CM

## 2019-06-15 DIAGNOSIS — G89.29: ICD-10-CM

## 2019-06-15 PROCEDURE — 99283 EMERGENCY DEPT VISIT LOW MDM: CPT

## 2019-06-15 PROCEDURE — 96372 THER/PROPH/DIAG INJ SC/IM: CPT
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
## 2019-07-14 ENCOUNTER — HOSPITAL ENCOUNTER (EMERGENCY)
Age: 34
Discharge: HOME | End: 2019-07-14
Payer: COMMERCIAL

## 2019-07-14 VITALS
OXYGEN SATURATION: 97 % | WEIGHT: 235 LBS | DIASTOLIC BLOOD PRESSURE: 88 MMHG | SYSTOLIC BLOOD PRESSURE: 137 MMHG | HEART RATE: 73 BPM | HEIGHT: 71 IN | BODY MASS INDEX: 32.9 KG/M2 | TEMPERATURE: 98.1 F | RESPIRATION RATE: 20 BRPM

## 2019-07-14 DIAGNOSIS — K08.89 PAIN, DENTAL: Primary | ICD-10-CM

## 2019-07-14 PROCEDURE — 99282 EMERGENCY DEPT VISIT SF MDM: CPT

## 2019-07-14 PROCEDURE — 99283 EMERGENCY DEPT VISIT LOW MDM: CPT | Performed by: NURSE PRACTITIONER

## 2019-07-14 RX ORDER — HYDROCODONE BITARTRATE AND ACETAMINOPHEN 5; 325 MG/1; MG/1
1 TABLET ORAL EVERY 6 HOURS PRN
Qty: 10 TABLET | Refills: 0 | Status: SHIPPED | OUTPATIENT
Start: 2019-07-14 | End: 2019-07-21

## 2019-07-14 RX ORDER — AMOXICILLIN 500 MG/1
500 CAPSULE ORAL 3 TIMES DAILY
Qty: 30 CAPSULE | Refills: 0 | Status: SHIPPED | OUTPATIENT
Start: 2019-07-14 | End: 2019-07-24

## 2019-07-14 ASSESSMENT — ENCOUNTER SYMPTOMS
TRISMUS: 0
FACIAL SWELLING: 0

## 2019-07-14 ASSESSMENT — PAIN SCALES - GENERAL: PAINLEVEL_OUTOF10: 7

## 2019-07-14 NOTE — ED TRIAGE NOTES
Pt to ED with c/o left upper dental and left ear pain that began today Pt reports pain beginning while eating

## 2019-07-14 NOTE — ED PROVIDER NOTES
 Marital status:      Spouse name: None    Number of children: None    Years of education: None    Highest education level: None   Occupational History    None   Social Needs    Financial resource strain: None    Food insecurity:     Worry: None     Inability: None    Transportation needs:     Medical: None     Non-medical: None   Tobacco Use    Smoking status: Never Smoker    Smokeless tobacco: Never Used   Substance and Sexual Activity    Alcohol use: No    Drug use: Yes     Types: Marijuana    Sexual activity: None   Lifestyle    Physical activity:     Days per week: None     Minutes per session: None    Stress: None   Relationships    Social connections:     Talks on phone: None     Gets together: None     Attends Christian service: None     Active member of club or organization: None     Attends meetings of clubs or organizations: None     Relationship status: None    Intimate partner violence:     Fear of current or ex partner: None     Emotionally abused: None     Physically abused: None     Forced sexual activity: None   Other Topics Concern    None   Social History Narrative    None       SCREENINGS    @FLOW(37524)@        PHYSICAL EXAM  (up to 7 for level 4, 8 or more for level 5)     ED Triage Vitals [07/14/19 1633]   BP Temp Temp src Pulse Resp SpO2 Height Weight   137/88 98.1 °F (36.7 °C) -- 73 20 97 % 5' 11\" (1.803 m) 235 lb (106.6 kg)       Physical Exam   Constitutional: He appears well-developed and well-nourished. HENT:   Head: Normocephalic and atraumatic. Right Ear: External ear normal.   Left Ear: Tympanic membrane and external ear normal.   Mouth/Throat: Uvula is midline, oropharynx is clear and moist and mucous membranes are normal.       No obvious caries  Tenderness to tooth  Appears cracked   Eyes: Right eye exhibits no discharge. Left eye exhibits no discharge. No scleral icterus. Neck: Normal range of motion. Neck supple. Cardiovascular: Normal rate.

## 2021-10-30 ENCOUNTER — APPOINTMENT (OUTPATIENT)
Dept: GENERAL RADIOLOGY | Facility: HOSPITAL | Age: 36
End: 2021-10-30

## 2021-10-30 ENCOUNTER — HOSPITAL ENCOUNTER (EMERGENCY)
Facility: HOSPITAL | Age: 36
Discharge: HOME | End: 2021-10-30
Admitting: EMERGENCY MEDICINE

## 2021-10-30 VITALS
RESPIRATION RATE: 16 BRPM | TEMPERATURE: 97.8 F | SYSTOLIC BLOOD PRESSURE: 162 MMHG | HEART RATE: 92 BPM | DIASTOLIC BLOOD PRESSURE: 94 MMHG | OXYGEN SATURATION: 96 % | BODY MASS INDEX: 34.54 KG/M2 | HEIGHT: 72 IN | WEIGHT: 255 LBS

## 2021-10-30 DIAGNOSIS — R10.2 PAIN IN PELVIS: ICD-10-CM

## 2021-10-30 DIAGNOSIS — S39.012A STRAIN OF LUMBAR REGION, INITIAL ENCOUNTER: Primary | ICD-10-CM

## 2021-10-30 DIAGNOSIS — M53.3 COCCYX PAIN: ICD-10-CM

## 2021-10-30 PROCEDURE — 72220 X-RAY EXAM SACRUM TAILBONE: CPT

## 2021-10-30 PROCEDURE — 72110 X-RAY EXAM L-2 SPINE 4/>VWS: CPT

## 2021-10-30 PROCEDURE — 99283 EMERGENCY DEPT VISIT LOW MDM: CPT

## 2021-10-30 PROCEDURE — 73523 X-RAY EXAM HIPS BI 5/> VIEWS: CPT

## 2021-10-30 PROCEDURE — 96372 THER/PROPH/DIAG INJ SC/IM: CPT

## 2021-10-30 PROCEDURE — 25010000002 KETOROLAC TROMETHAMINE PER 15 MG: Performed by: NURSE PRACTITIONER

## 2021-10-30 PROCEDURE — 25010000002 ORPHENADRINE CITRATE PER 60 MG: Performed by: NURSE PRACTITIONER

## 2021-10-30 RX ORDER — CYCLOBENZAPRINE HCL 10 MG
10 TABLET ORAL 2 TIMES DAILY PRN
Qty: 20 TABLET | Refills: 0 | Status: SHIPPED | OUTPATIENT
Start: 2021-10-30 | End: 2021-11-09

## 2021-10-30 RX ORDER — KETOROLAC TROMETHAMINE 30 MG/ML
60 INJECTION, SOLUTION INTRAMUSCULAR; INTRAVENOUS ONCE
Status: COMPLETED | OUTPATIENT
Start: 2021-10-30 | End: 2021-10-30

## 2021-10-30 RX ORDER — HYDROCODONE BITARTRATE AND ACETAMINOPHEN 7.5; 325 MG/1; MG/1
1 TABLET ORAL ONCE
Status: COMPLETED | OUTPATIENT
Start: 2021-10-30 | End: 2021-10-30

## 2021-10-30 RX ORDER — NAPROXEN 500 MG/1
500 TABLET ORAL 2 TIMES DAILY WITH MEALS
Qty: 20 TABLET | Refills: 0 | Status: SHIPPED | OUTPATIENT
Start: 2021-10-30 | End: 2021-11-09

## 2021-10-30 RX ORDER — ORPHENADRINE CITRATE 30 MG/ML
60 INJECTION INTRAMUSCULAR; INTRAVENOUS ONCE
Status: COMPLETED | OUTPATIENT
Start: 2021-10-30 | End: 2021-10-30

## 2021-10-30 RX ORDER — METHYLPREDNISOLONE 4 MG/1
TABLET ORAL
Qty: 21 TABLET | Refills: 0 | Status: SHIPPED | OUTPATIENT
Start: 2021-10-30

## 2021-10-30 RX ADMIN — KETOROLAC TROMETHAMINE 60 MG: 30 INJECTION, SOLUTION INTRAMUSCULAR at 15:41

## 2021-10-30 RX ADMIN — ORPHENADRINE CITRATE 60 MG: 30 INJECTION INTRAMUSCULAR; INTRAVENOUS at 15:41

## 2021-10-30 RX ADMIN — HYDROCODONE BITARTRATE AND ACETAMINOPHEN 1 TABLET: 7.5; 325 TABLET ORAL at 16:46
